# Patient Record
Sex: FEMALE | URBAN - METROPOLITAN AREA
[De-identification: names, ages, dates, MRNs, and addresses within clinical notes are randomized per-mention and may not be internally consistent; named-entity substitution may affect disease eponyms.]

---

## 2021-12-06 ENCOUNTER — OFFICE VISIT (OUTPATIENT)
Dept: URBAN - METROPOLITAN AREA CLINIC 46 | Facility: CLINIC | Age: 66
End: 2021-12-06
Payer: COMMERCIAL

## 2021-12-06 DIAGNOSIS — H16.223 KERATOCONJUNCTIVITIS SICCA, BILATERAL: ICD-10-CM

## 2021-12-06 DIAGNOSIS — Z96.1 PRESENCE OF INTRAOCULAR LENS: ICD-10-CM

## 2021-12-06 DIAGNOSIS — H43.811 VITREOUS DEGENERATION, RIGHT EYE: ICD-10-CM

## 2021-12-06 DIAGNOSIS — H40.11X1 PRIMARY OPEN-ANGLE GLAUCOMA, MILD STAGE: ICD-10-CM

## 2021-12-06 DIAGNOSIS — H40.1131 PRIMARY OPEN-ANGLE GLAUCOMA, MILD STAGE, BILATERAL: Primary | ICD-10-CM

## 2021-12-06 PROCEDURE — 99214 OFFICE O/P EST MOD 30 MIN: CPT | Performed by: OPTOMETRIST

## 2021-12-06 PROCEDURE — 92083 EXTENDED VISUAL FIELD XM: CPT | Performed by: OPTOMETRIST

## 2021-12-06 PROCEDURE — 92133 CPTRZD OPH DX IMG PST SGM ON: CPT | Performed by: OPTOMETRIST

## 2021-12-06 RX ORDER — LATANOPROST 50 UG/ML
0.005 % SOLUTION OPHTHALMIC
Qty: 2.5 | Refills: 12 | Status: ACTIVE
Start: 2021-12-06

## 2021-12-06 ASSESSMENT — INTRAOCULAR PRESSURE
OD: 18
OD: 20
OS: 19

## 2021-12-06 NOTE — IMPRESSION/PLAN
Impression: Vitreous degeneration, right eye: H43.811. Plan: Posterior vitreous OS detachment accounts for the patient's complaints. There is no evidence of retinal pathology. All signs and risks of retinal detachment and tears were discussed in detail. Patient instructed to call the office immediately if any symptoms noted. Recommend the patient return to office for follow up.

## 2021-12-06 NOTE — IMPRESSION/PLAN
Impression: Keratoconjunctivitis sicca, bilateral: P26.372. Plan: Dry eyes account for the patient's complaints. There is no evidence of permanent changes to the cornea. Explained condition does not have a cure and will need artificial tears for maintenance.

## 2021-12-06 NOTE — IMPRESSION/PLAN
Impression: Primary open-angle glaucoma, mild stage, bilateral: H40.1131. Plan: Intraocular pressure elevated. Resent Latanoprost 1 gtt OU qhs. F/u 1 month for IOP Check.

## 2022-01-12 ENCOUNTER — OFFICE VISIT (OUTPATIENT)
Dept: URBAN - METROPOLITAN AREA CLINIC 46 | Facility: CLINIC | Age: 67
End: 2022-01-12
Payer: COMMERCIAL

## 2022-01-12 PROCEDURE — 92012 INTRM OPH EXAM EST PATIENT: CPT | Performed by: OPTOMETRIST

## 2022-01-12 ASSESSMENT — INTRAOCULAR PRESSURE
OD: 17
OD: 18
OS: 18
OS: 15

## 2022-01-12 NOTE — IMPRESSION/PLAN
Impression: Primary open-angle glaucoma, mild stage, bilateral: H40.1131. Plan: Condition and IOP are stable today. Current therapy is working well, no changes being made to current treatment at this time. Continue Latanoprost 1 gtt OU qhs. Emphasized and explained compliance. Reassured patient of current condition and treatment and discussed risks of glaucoma progression. Will continue to monitor IOP.

## 2022-07-26 ENCOUNTER — OFFICE VISIT (OUTPATIENT)
Dept: URBAN - METROPOLITAN AREA CLINIC 46 | Facility: CLINIC | Age: 67
End: 2022-07-26
Payer: COMMERCIAL

## 2022-07-26 DIAGNOSIS — Z96.1 PRESENCE OF INTRAOCULAR LENS: ICD-10-CM

## 2022-07-26 DIAGNOSIS — H16.223 KERATOCONJUNCTIVITIS SICCA, BILATERAL: ICD-10-CM

## 2022-07-26 DIAGNOSIS — H40.1131 PRIMARY OPEN-ANGLE GLAUCOMA, MILD STAGE, BILATERAL: Primary | ICD-10-CM

## 2022-07-26 DIAGNOSIS — H16.143 PUNCTATE KERATITIS, BILATERAL: ICD-10-CM

## 2022-07-26 PROCEDURE — 92012 INTRM OPH EXAM EST PATIENT: CPT | Performed by: OPTOMETRIST

## 2022-07-26 PROCEDURE — 92020 GONIOSCOPY: CPT | Performed by: OPTOMETRIST

## 2022-07-26 ASSESSMENT — KERATOMETRY
OS: 43.44
OD: 43.22

## 2022-07-26 ASSESSMENT — INTRAOCULAR PRESSURE
OS: 18
OD: 18

## 2022-07-26 NOTE — IMPRESSION/PLAN
Impression: Primary open-angle glaucoma, mild stage, bilateral: H40.4301. Plan: IOP and condition are stable today. No changes being made to current regimen. Recommend monitoring condition at this time. Continue Latanoprost OU QHS. Goniotomy preformed back in 2019 by Dr. Juan José Kidd.

## 2022-07-26 NOTE — IMPRESSION/PLAN
Impression: Keratoconjunctivitis sicca, bilateral: Q34.446. Plan: Dry eyes account for the patient's complaints. There is no evidence of permanent changes to the cornea. Explained condition does not have a cure and will need artificial tears for maintenance. Patient instructed to use artificial tears 4-6x/daily. Explained it may take time for eyes to acclimate completely to OTC regiment.

## 2022-11-01 ENCOUNTER — OFFICE VISIT (OUTPATIENT)
Dept: URBAN - METROPOLITAN AREA CLINIC 46 | Facility: CLINIC | Age: 67
End: 2022-11-01
Payer: COMMERCIAL

## 2022-11-01 DIAGNOSIS — Z96.1 PRESENCE OF INTRAOCULAR LENS: ICD-10-CM

## 2022-11-01 DIAGNOSIS — H16.223 KERATOCONJUNCTIVITIS SICCA, BILATERAL: ICD-10-CM

## 2022-11-01 DIAGNOSIS — H16.143 PUNCTATE KERATITIS, BILATERAL: ICD-10-CM

## 2022-11-01 DIAGNOSIS — H40.1131 PRIMARY OPEN-ANGLE GLAUCOMA, MILD STAGE, BILATERAL: Primary | ICD-10-CM

## 2022-11-01 DIAGNOSIS — H43.813 VITREOUS DEGENERATION, BILATERAL: ICD-10-CM

## 2022-11-01 PROCEDURE — 92250 FUNDUS PHOTOGRAPHY W/I&R: CPT | Performed by: OPTOMETRIST

## 2022-11-01 PROCEDURE — 99214 OFFICE O/P EST MOD 30 MIN: CPT | Performed by: OPTOMETRIST

## 2022-11-01 RX ORDER — LATANOPROST 50 UG/ML
0.005 % SOLUTION OPHTHALMIC
Qty: 2.5 | Refills: 12 | Status: INACTIVE
Start: 2022-11-01 | End: 2022-11-01

## 2022-11-01 ASSESSMENT — INTRAOCULAR PRESSURE
OD: 16
OS: 14

## 2022-11-01 ASSESSMENT — KERATOMETRY
OS: 43.72
OD: 43.08

## 2022-11-01 NOTE — IMPRESSION/PLAN
Impression: Keratoconjunctivitis sicca, bilateral: F16.580. Plan: Dry eyes account for the patient's complaints. There is no evidence of permanent changes to the cornea. Explained condition does not have a cure and will need artificial tears for maintenance, recommended to use 4-6 times a day.

## 2023-04-03 NOTE — IMPRESSION/PLAN
Impression: Primary open-angle glaucoma, mild stage, bilateral: H40.1131. Plan: IOP and condition are stable today. No changes being made to current regimen. Recommend monitoring condition at this time. Continue Latanoprost OU QHS. Disc photos preformed at today's visit, testing stable with no changes noted.  Patient was not compliant with glaucoma drops last night

## 2023-04-03 NOTE — IMPRESSION/PLAN
Impression: Keratoconjunctivitis sicca, bilateral: V80.899. Plan: Dry eyes account for the patient's complaints. There is no evidence of permanent changes to the cornea. Explained condition does not have a cure and will need artificial tears for maintenance, recommended to use 4-6 times a day.